# Patient Record
Sex: FEMALE | Race: AMERICAN INDIAN OR ALASKA NATIVE
[De-identification: names, ages, dates, MRNs, and addresses within clinical notes are randomized per-mention and may not be internally consistent; named-entity substitution may affect disease eponyms.]

---

## 2021-04-13 LAB
ALBUMIN SERPL-MCNC: 3.8 G/DL (ref 3.9–5)
ALT SERPL-CCNC: 20 UNITS/L (ref 7–56)
BASOPHILS # (AUTO): 0 K/MM3 (ref 0–0.1)
BASOPHILS NFR BLD AUTO: 0.3 % (ref 0–1.8)
BUN SERPL-MCNC: 9 MG/DL (ref 7–17)
BUN/CREAT SERPL: 15 %
CALCIUM SERPL-MCNC: 9 MG/DL (ref 8.4–10.2)
EOSINOPHIL # BLD AUTO: 0.1 K/MM3 (ref 0–0.4)
EOSINOPHIL NFR BLD AUTO: 1.6 % (ref 0–4.3)
HCT VFR BLD CALC: 34.3 % (ref 30.3–42.9)
HEMOLYSIS INDEX: 0
HGB BLD-MCNC: 12.1 GM/DL (ref 10.1–14.3)
LYMPHOCYTES # BLD AUTO: 1.5 K/MM3 (ref 1.2–5.4)
LYMPHOCYTES NFR BLD AUTO: 29.4 % (ref 13.4–35)
MCHC RBC AUTO-ENTMCNC: 35 % (ref 30–34)
MCV RBC AUTO: 83 FL (ref 79–97)
MONOCYTES # (AUTO): 0.3 K/MM3 (ref 0–0.8)
MONOCYTES % (AUTO): 6.1 % (ref 0–7.3)
PLATELET # BLD: 401 K/MM3 (ref 140–440)
RBC # BLD AUTO: 4.14 M/MM3 (ref 3.65–5.03)

## 2021-04-13 NOTE — HISTORY AND PHYSICAL REPORT
History of Present Illness


Date of examination: 21


Date of admission: 


2021


Chief complaint: 





I have fibroids


History of present illness: 





Pt is a 50 year old  who presents with complaint of uterine fibroids that 

are causing pain and bleeding. Pt has noted that symptoms have gotten worse with

time and she is looking for curative therapy.





Medications and Allergies


                                    Allergies











Allergy/AdvReac Type Severity Reaction Status Date / Time


 


No Known Allergies Allergy   Unverified 21 14:58











                                Home Medications











 Medication  Instructions  Recorded  Confirmed  Last Taken  Type


 


Hydroxychloroquine [Plaquenil] 200 mg PO QDAY 21 Unknown History


 


Indomethacin [Indocin] 15 mg PO Q8H 21 Unknown History











Active Meds: 


Active Medications





Acetaminophen (Acetaminophen 500 Mg Tab)  1,000 mg PO PREOP BRITTNEY


   Stop: 21 20:00


Celecoxib (Celecoxib 200 Mg Cap)  200 mg PO PREOP NR


   Stop: 21 20:00


Fentanyl (Fentanyl 100 Mcg/2 Ml Inj)  100 mcg IV ONCE PRN


   PRN Reason: sedation for nerve block


   Stop: 21 20:00


Gabapentin (Gabapentin 300 Mg Cap)  300 mg PO PREOP NR


   Stop: 21 20:00


Lactated Ringer's (Lactated Ringers)  1,000 mls @ 100 mls/hr IV AS DIRECT BRITTNEY


   Stop: 21 23:59


Cefazolin Sodium (Ancef/Sterile Water 2 Gm/20 Ml)  2 gm in 20 mls @ 80 mls/hr IV

PREOP NR; Protocol


Midazolam HCl (Midazolam 2 Mg/2 Ml Inj)  2 mg IV PREOP NR


   Stop: 21 20:00


Scopolamine (Scopolamine Transdermal Patch 72 Hr)  1 each TD PREOP NR


   Stop: 21 20:00











Exam


                                   Vital Signs











Temp Pulse Resp BP Pulse Ox


 


 97.8 F   86   18   126/90   99 


 


 21 10:30  21 10:30  21 10:30  21 10:30  21 10:30














- General physical appearance


Positive: well developed, well nourished, no distress





- Eyes


Positive: PERRL, normal occular movement





- Neck


Positive: no masses, no bruits, trachea midline, no venous distension





- Respiratory


Positive: normal expansion, normal respiratory effort, clear to auscultation





- Cardiovascular


Rhythm: regular


Heart Sounds: Present: S1 & S2.  Absent: rub, click





- Extremities


Extremities: no ischemia, pulses symmetrical, No edema





- Abdomen


Abdomen: Present: soft, bowel sounds normal, masses





- Genitourinary


Female Genitourinary: masses (enlarged irregular uterus consistent with uterine 

fibroids)





- Psychiatric


Psychiatric: appropriate mood/affect





Results





- Labs





                                 21 10:40





                                 21 10:40


                              Abnormal lab results











  21 Range/Units





  10:40 10:40 


 


MCHC  35 H   (30-34)  %


 


Sodium   136 L  (137-145)  mmol/L


 


Albumin   3.8 L  (3.9-5)  g/dL








                                 Diabetes panel











  21 Range/Units





  10:40 


 


Sodium  136 L  (137-145)  mmol/L


 


Potassium  4.0  (3.6-5.0)  mmol/L


 


Chloride  102.7  ()  mmol/L


 


Carbon Dioxide  24  (22-30)  mmol/L


 


BUN  9  (7-17)  mg/dL


 


Creatinine  0.6  (0.6-1.2)  mg/dL


 


Glucose  92  ()  mg/dL


 


Calcium  9.0  (8.4-10.2)  mg/dL


 


AST  13  (5-40)  units/L


 


ALT  20  (7-56)  units/L


 


Alkaline Phosphatase  104  ()  units/L


 


Total Protein  7.3  (6.3-8.2)  g/dL


 


Albumin  3.8 L  (3.9-5)  g/dL








                                  Calcium panel











  21 Range/Units





  10:40 


 


Calcium  9.0  (8.4-10.2)  mg/dL


 


Albumin  3.8 L  (3.9-5)  g/dL








                                 Pituitary panel











  21 Range/Units





  10:40 


 


Sodium  136 L  (137-145)  mmol/L


 


Potassium  4.0  (3.6-5.0)  mmol/L


 


Chloride  102.7  ()  mmol/L


 


Carbon Dioxide  24  (22-30)  mmol/L


 


BUN  9  (7-17)  mg/dL


 


Creatinine  0.6  (0.6-1.2)  mg/dL


 


Glucose  92  ()  mg/dL


 


Calcium  9.0  (8.4-10.2)  mg/dL








                                  Adrenal panel











  21 Range/Units





  10:40 


 


Sodium  136 L  (137-145)  mmol/L


 


Potassium  4.0  (3.6-5.0)  mmol/L


 


Chloride  102.7  ()  mmol/L


 


Carbon Dioxide  24  (22-30)  mmol/L


 


BUN  9  (7-17)  mg/dL


 


Creatinine  0.6  (0.6-1.2)  mg/dL


 


Glucose  92  ()  mg/dL


 


Calcium  9.0  (8.4-10.2)  mg/dL


 


Total Bilirubin  0.20  (0.1-1.2)  mg/dL


 


AST  13  (5-40)  units/L


 


ALT  20  (7-56)  units/L


 


Alkaline Phosphatase  104  ()  units/L


 


Total Protein  7.3  (6.3-8.2)  g/dL


 


Albumin  3.8 L  (3.9-5)  g/dL














Assessment and Plan





Pt here for abdominal myomectomy. consents signed and placed on chart. Pt 

understands risks and benefits. Will proceed with surgery

## 2021-04-13 NOTE — ANESTHESIA CONSULTATION
Anesthesia Consult and Med Hx


Date of service: 04/14/21





- Airway


Anesthetic Teeth Evaluation: Good


ROM Head & Neck: Adequate


Mental/Hyoid Distance: Adequate


Mallampati Class: Class II


Intubation Access Assessment: Probably Good





- Pulmonary Exam


CTA: Yes





- Cardiac Exam


Cardiac Exam: RRR





- Pre-Operative Health Status


ASA Pre-Surgery Classification: ASA2


Proposed Anesthetic Plan: General


Nerve Block: TAP





- Pulmonary


Hx Smoking: No


Hx Respiratory Symptoms: No





- Cardiovascular System


Hx Hypertension: No





- Central Nervous System


CVA: No





- Endocrine


Hx Renal Disease: No


Hx Liver Disease: No


Hx Insulin Dependent Diabetes: No


Hx Non-Insulin Dependent Diabetes: No


Hx Thyroid Disease: No





- Hematic


Hx Anemia: Yes





- Other Systems


Hx Obesity: Yes (BMI 33)





- Additional Comments


Anesthesia Medical History Comments: PMH RA on plaquenil. No hx steroid use. No 

hx anesthetic complications.

## 2021-04-14 ENCOUNTER — HOSPITAL ENCOUNTER (INPATIENT)
Dept: HOSPITAL 5 - OR | Age: 51
LOS: 3 days | Discharge: HOME | DRG: 743 | End: 2021-04-17
Attending: OBSTETRICS & GYNECOLOGY | Admitting: OBSTETRICS & GYNECOLOGY
Payer: MEDICARE

## 2021-04-14 DIAGNOSIS — D64.9: ICD-10-CM

## 2021-04-14 DIAGNOSIS — D25.1: Primary | ICD-10-CM

## 2021-04-14 DIAGNOSIS — N92.0: ICD-10-CM

## 2021-04-14 DIAGNOSIS — Z20.822: ICD-10-CM

## 2021-04-14 DIAGNOSIS — Z79.891: ICD-10-CM

## 2021-04-14 DIAGNOSIS — E66.9: ICD-10-CM

## 2021-04-14 DIAGNOSIS — D25.2: ICD-10-CM

## 2021-04-14 DIAGNOSIS — Z79.01: ICD-10-CM

## 2021-04-14 DIAGNOSIS — Z79.899: ICD-10-CM

## 2021-04-14 PROCEDURE — 86901 BLOOD TYPING SEROLOGIC RH(D): CPT

## 2021-04-14 PROCEDURE — 88304 TISSUE EXAM BY PATHOLOGIST: CPT

## 2021-04-14 PROCEDURE — 84703 CHORIONIC GONADOTROPIN ASSAY: CPT

## 2021-04-14 PROCEDURE — 36415 COLL VENOUS BLD VENIPUNCTURE: CPT

## 2021-04-14 PROCEDURE — 0UB90ZZ EXCISION OF UTERUS, OPEN APPROACH: ICD-10-PCS | Performed by: OBSTETRICS & GYNECOLOGY

## 2021-04-14 PROCEDURE — 85018 HEMOGLOBIN: CPT

## 2021-04-14 PROCEDURE — 88305 TISSUE EXAM BY PATHOLOGIST: CPT

## 2021-04-14 PROCEDURE — 80053 COMPREHEN METABOLIC PANEL: CPT

## 2021-04-14 PROCEDURE — 86850 RBC ANTIBODY SCREEN: CPT

## 2021-04-14 PROCEDURE — 64450 NJX AA&/STRD OTHER PN/BRANCH: CPT

## 2021-04-14 PROCEDURE — 85014 HEMATOCRIT: CPT

## 2021-04-14 PROCEDURE — U0003 INFECTIOUS AGENT DETECTION BY NUCLEIC ACID (DNA OR RNA); SEVERE ACUTE RESPIRATORY SYNDROME CORONAVIRUS 2 (SARS-COV-2) (CORONAVIRUS DISEASE [COVID-19]), AMPLIFIED PROBE TECHNIQUE, MAKING USE OF HIGH THROUGHPUT TECHNOLOGIES AS DESCRIBED BY CMS-2020-01-R: HCPCS

## 2021-04-14 PROCEDURE — 86900 BLOOD TYPING SEROLOGIC ABO: CPT

## 2021-04-14 PROCEDURE — 85025 COMPLETE CBC W/AUTO DIFF WBC: CPT

## 2021-04-14 PROCEDURE — C1765 ADHESION BARRIER: HCPCS

## 2021-04-14 RX ADMIN — KETOROLAC TROMETHAMINE SCH MG: 30 INJECTION, SOLUTION INTRAMUSCULAR at 23:49

## 2021-04-14 RX ADMIN — DEXTROSE, SODIUM CHLORIDE, SODIUM LACTATE, POTASSIUM CHLORIDE, AND CALCIUM CHLORIDE SCH MLS/HR: 5; .6; .31; .03; .02 INJECTION, SOLUTION INTRAVENOUS at 13:22

## 2021-04-14 RX ADMIN — OXYCODONE AND ACETAMINOPHEN PRN TAB: 5; 325 TABLET ORAL at 14:43

## 2021-04-14 RX ADMIN — KETOROLAC TROMETHAMINE SCH MG: 30 INJECTION, SOLUTION INTRAMUSCULAR at 18:05

## 2021-04-14 RX ADMIN — DOCUSATE SODIUM SCH MG: 100 CAPSULE, LIQUID FILLED ORAL at 22:30

## 2021-04-14 RX ADMIN — DEXTROSE, SODIUM CHLORIDE, SODIUM LACTATE, POTASSIUM CHLORIDE, AND CALCIUM CHLORIDE SCH MLS/HR: 5; .6; .31; .03; .02 INJECTION, SOLUTION INTRAVENOUS at 23:49

## 2021-04-14 RX ADMIN — OXYCODONE AND ACETAMINOPHEN PRN TAB: 5; 325 TABLET ORAL at 22:30

## 2021-04-14 RX ADMIN — KETOROLAC TROMETHAMINE SCH MG: 30 INJECTION, SOLUTION INTRAMUSCULAR at 12:10

## 2021-04-14 NOTE — OPERATIVE REPORT
Operative Report


Operative Report: 


Preoperative diagnosis: Uterine fibroids 





Postoperative diagnosis: Same





Procedure: Abdominal myomectomy





Surgeon: Dr. Abbie Mcqueen


Assistant: 


EBL: 200 cc


Urine output: 400 cc


IV fluids: 1800 cc LR


Cell saver return: 0 mL


Findings: Markedly enlarged 18 week uterus secondary to multiple uterine 

fibroids of various sizes


Specimens: Uterine fibroids


Complications: None





Procedure: The patient was admitted to the OR with IV running and in place.  She

was properly identified as herself.  She was given general anesthesia without 

difficulty.  She was then put a Mcgowan catheter was in place and she was prepped 

and draped in normal sterile fashion.  This incision was then made with the 

scalpel and carried to underlying fascia using the scalpel and the Bovie.  

Fascia was incised in midline and the incision was extended bilaterally using 

the curved Zeng scissors.  The fascia was then dissected from the underlying 

rectus muscles in a series of sharp and blunt dissection.  The peritoneum was 

entered sharply using the Metzenbaum scissors.  The peritoneal incision was then

extended superiorly and inferiorly.  The uterus was grasped and delivered 

through the incision anteriorly.  Multiple fibroids were noted some were 

pedunculated others were intramural.  The largest fibroid appeared to be in the 

fundus of the uterus.  At this point the uterus was injected with Pitressin 

solution in multiple areas totaling 25 cc.  Using the Bovie latitudinal incision

was made over the area of the largest fibroid.  It was found to have some 

calcifications in its body.  The fibroid was dissected from the underlying 

uterus using the Metzenbaum scissors and retraction.  The fibroid appeared to be

approximately 8 cm in diameter.  The base of the fibroid was then closed in a 

running fashion with 0 Vicryl and the serosa was closed with 2-0 Vicryl in a 

baseball stitch.  Once this area was close attention was turned to the area of 

the uterus.  Multiple fibroids were taken of both those that were pedunculated 

in those that were intramural until in total 12 fibroids were removed.  The 

areas underlying all fibroids were closed with 0 Vicryl in running fashion these

were closed in multiple layers until the serosal layer which was closed with a 

baseball stitch. Pieces of Interceed were then placed over the external portions

of the incisions.  The specimens was handed off.  The abdomen was then copiously

irrigated with warm normal saline.  Following this the uterus was replaced into 

the abdominal cavity.  At this point the muscles were reapproximated in the 

midline using individual sutures of 0 Vicryl.  Following this the fascia was 

closed in a running fashion using 0 Vicryl.  Tissue was then copiously irrig

ated.   Skin was closed in a running fashion using 3-0 Monocryl.  The sponge lap

needle and instrument counts were correct 2.  The patient tolerated the 

procedure well.  She was taken to recovery in stable condition.

## 2021-04-14 NOTE — POST OPERATIVE NOTE
Pre-op diagnosis: Uterine fibroids, menorrhagia, pelvic pain


Post-op diagnosis: same


Findings: 





Uterine fibroids of various shapes and sizes within the confines of an 

irregularly shaped enlarged uterus


Procedure: 





Abdominal myomectomy


Anesthesia: LYNDA


Surgeon: LEN COLEY


Estimated blood loss: other (200)


Pathology: list (Multiple fibroids of various sizes)


Specimen disposition: to lab


Condition: stable


Disposition: PACU

## 2021-04-15 LAB
HCT VFR BLD CALC: 27 % (ref 30.3–42.9)
HGB BLD-MCNC: 9 GM/DL (ref 10.1–14.3)

## 2021-04-15 RX ADMIN — SIMETHICONE PRN MG: 80 TABLET, CHEWABLE ORAL at 18:12

## 2021-04-15 RX ADMIN — OXYCODONE AND ACETAMINOPHEN PRN TAB: 5; 325 TABLET ORAL at 10:06

## 2021-04-15 RX ADMIN — OXYCODONE AND ACETAMINOPHEN PRN TAB: 5; 325 TABLET ORAL at 13:46

## 2021-04-15 RX ADMIN — DEXTROSE, SODIUM CHLORIDE, SODIUM LACTATE, POTASSIUM CHLORIDE, AND CALCIUM CHLORIDE SCH MLS/HR: 5; .6; .31; .03; .02 INJECTION, SOLUTION INTRAVENOUS at 08:15

## 2021-04-15 RX ADMIN — IBUPROFEN PRN MG: 800 TABLET, FILM COATED ORAL at 22:23

## 2021-04-15 RX ADMIN — OXYCODONE AND ACETAMINOPHEN PRN TAB: 5; 325 TABLET ORAL at 20:06

## 2021-04-15 RX ADMIN — DOCUSATE SODIUM SCH MG: 100 CAPSULE, LIQUID FILLED ORAL at 22:22

## 2021-04-15 RX ADMIN — KETOROLAC TROMETHAMINE SCH MG: 30 INJECTION, SOLUTION INTRAMUSCULAR at 05:20

## 2021-04-15 RX ADMIN — IBUPROFEN PRN MG: 800 TABLET, FILM COATED ORAL at 08:15

## 2021-04-15 RX ADMIN — DOCUSATE SODIUM SCH MG: 100 CAPSULE, LIQUID FILLED ORAL at 10:07

## 2021-04-15 NOTE — PROGRESS NOTE
Assessment and Plan





POD 1 s/p abdominal myomectomy. Doing well. Encourage ambulation . Will advance 

diet after flatus 





Subjective





- Subjective


Date of service: 04/15/21


Interval history: 





Pt is a 50 year old  who is POD 1 s/p Abdominal myomectomy. No flatus yet. 

Doing well. 


Patient reports: appetite normal, voiding normally, pain well controlled, 

ambulating normally





Objective





- Vital Signs


Latest vital signs: 


                                   Vital Signs











  Temp Pulse Pulse Resp BP BP Pulse Ox


 


 04/15/21 16:10  98 F  74   18   115/68  98


 


 04/15/21 14:46     16   


 


 04/15/21 13:46     16   


 


 04/15/21 12:20  97.9 F  73   18   100/59  100


 


 04/15/21 11:06     16   


 


 04/15/21 10:06     16   


 


 04/15/21 09:15     16   


 


 04/15/21 08:44    70  16   


 


 04/15/21 08:32  97.9 F  84   18   106/64  100


 


 04/15/21 08:15     16   


 


 04/15/21 05:50     18   


 


 04/15/21 05:20     18   


 


 04/15/21 05:14  97.7 F  68   18  95/60   99


 


 04/15/21 00:19  98.2 F  61   18  103/60   97


 


 21 23:49     18   


 


 21 23:30     18   


 


 21 22:30     18   


 


 21 21:01  97.3 F L  64   18  98/52   100








                                Intake and Output











 04/15/21 04/15/21 04/15/21





 06:59 14:59 22:59


 


Intake Total 120 1000 


 


Output Total 500 500 


 


Balance -380 500 


 


Intake:   


 


  IV  1000 


 


    D5lr 1,000 ml @ 125 mls/  1000 





    hr IV AS DIRECT [facility] Rx#:   





    [id number]   


 


  Intake, Free Water 120  


 


Output:   


 


  Urine 500 500 


 


    Indwelling Catheter 500  


 


    Void  500 


 


Other:   


 


  Total, Output Amount 500 500 


 


  Voiding Method  Toilet 


 


  # Voids   


 


    Void  1 














- Exam


Cardiovascular: Present: Regular rate, Normal S1, Normal S2


Lungs: Present: Clear to auscultation, Normal air movement


Abdomen: Present: normal appearance, soft, distention (slightly)


Uterus: Present: normal, firm


Extremities: Present: normal


Incision: Present: normal, dry, intact, dressed





- Labs


Labs: 


                              Abnormal lab results











  04/15/21 Range/Units





  07:01 


 


Hgb  9.0 L D  (10.1-14.3)  gm/dl


 


Hct  27.0 L D  (30.3-42.9)  %

## 2021-04-16 RX ADMIN — DOCUSATE SODIUM SCH MG: 100 CAPSULE, LIQUID FILLED ORAL at 23:13

## 2021-04-16 RX ADMIN — DOCUSATE SODIUM SCH MG: 100 CAPSULE, LIQUID FILLED ORAL at 10:34

## 2021-04-16 RX ADMIN — SIMETHICONE PRN MG: 80 TABLET, CHEWABLE ORAL at 11:54

## 2021-04-16 RX ADMIN — OXYCODONE AND ACETAMINOPHEN PRN TAB: 5; 325 TABLET ORAL at 05:15

## 2021-04-16 RX ADMIN — OXYCODONE AND ACETAMINOPHEN PRN TAB: 5; 325 TABLET ORAL at 15:38

## 2021-04-16 RX ADMIN — OXYCODONE AND ACETAMINOPHEN PRN TAB: 5; 325 TABLET ORAL at 23:13

## 2021-04-16 RX ADMIN — OXYCODONE AND ACETAMINOPHEN PRN TAB: 5; 325 TABLET ORAL at 10:34

## 2021-04-16 NOTE — DISCHARGE SUMMARY
Providers





- Providers


Date of Admission: 


04/14/21 11:53





Date of discharge: 04/17/21


Attending physician: 


LEN COLEY








Hospitalization


Reason for admission: other


Procedure: other (abdominal myomectomy)


Incision: normal, dry, intact


Discharge diagnosis: other (uterine fibroids)


Condition at discharge: Good


Disposition: DC-01 TO HOME OR SELFCARE





Plan





- Discharge Medications


Prescriptions: 


Docusate Sodium [Colace] 100 mg PO BID #60 capsule


Ibuprofen [Motrin] 800 mg PO Q8HR PRN #40 tablet


 PRN Reason: Pain, Moderate (4-6)


oxyCODONE /ACETAMINOPHEN [Percocet 5/325] 2 tab PO Q6HR PRN #40 tablet


 PRN Reason: Pain





- Provider Discharge Summary


Activity: routine, no sex for 6 weeks, no heavy lifting 4 weeks, no strenuous 

exercise


Diet: routine


Instructions: routine


Additional instructions: 


[]  Smoking cessation referral if applicable(refer to patient education folder 

for contact #)


[]  Refer to [facility] Booklet








Call your doctor immediately for:


* Fever > 100.5


* Heavy vaginal bleeding ( >1 pad per hour)


* Severe persistent headache


* Shortness of breath


* Reddened, hot, painful area to leg or breast


* Drainage or odor from incision.





* Keep incision clean and dry at all times and follow doctor's instructions 

regarding bathing/showering











- Follow up plan


Follow up: 


DR ALCIRA [Other] - 7 Days


LEN COLEY MD [Staff Physician] - 10 Days

## 2021-04-16 NOTE — PROGRESS NOTE
Assessment and Plan





POD 2 s/p abdominal myomectomy. Pt has yet to pass flatus, but feels rumbling. 

Plan for discharge in the am and after flatus. Will advance diet





Subjective





- Subjective


Date of service: 21


Interval history: 





Pt is a 50 year old  who is POD 1 s/p Abdominal myomectomy. No flatus yet. 

Doing well. 


Patient reports: appetite normal, voiding normally, pain well controlled, 

ambulating normally, other (no flatus yet)





Objective





- Vital Signs


Latest vital signs: 


                                   Vital Signs











  Temp Pulse Resp BP BP Pulse Ox


 


 21 16:31   77   123/78   100


 


 21 16:30  98.6 F   18   


 


 21 08:30  98.0 F  77  18  125/76   97


 


 21 06:04  97.8 F  83  18   109/62  95


 


 21 01:30  97.9 F  74  20   125/65 


 


 04/15/21 20:41  98.4 F  68  18  114/77   97








                                Intake and Output











 21





 06:59 14:59 22:59


 


Intake Total 120  


 


Balance 120  


 


Intake:   


 


  Intake, Free Water 120  


 


Other:   


 


  # Voids   


 


    Void 2  














- Exam


Breasts: Present: deferred


Cardiovascular: Present: Regular rate, Normal S1, Normal S2


Lungs: Present: Clear to auscultation, Normal air movement


Abdomen: Present: normal appearance, soft


Extremities: Present: normal


Incision: Present: normal, dry, intact

## 2021-04-17 VITALS — DIASTOLIC BLOOD PRESSURE: 75 MMHG | SYSTOLIC BLOOD PRESSURE: 114 MMHG

## 2021-04-17 RX ADMIN — KETOROLAC TROMETHAMINE SCH: 30 INJECTION, SOLUTION INTRAMUSCULAR at 00:27

## 2021-04-17 RX ADMIN — OXYCODONE AND ACETAMINOPHEN PRN TAB: 5; 325 TABLET ORAL at 06:31

## 2021-04-17 RX ADMIN — OXYCODONE AND ACETAMINOPHEN PRN TAB: 5; 325 TABLET ORAL at 10:19

## 2021-04-17 RX ADMIN — IBUPROFEN PRN MG: 800 TABLET, FILM COATED ORAL at 10:21

## 2021-04-17 RX ADMIN — DOCUSATE SODIUM SCH MG: 100 CAPSULE, LIQUID FILLED ORAL at 10:22

## 2024-09-15 PROBLEM — Z00.00 ENCOUNTER FOR PREVENTIVE HEALTH EXAMINATION: Status: ACTIVE | Noted: 2024-09-15

## 2024-09-16 PROBLEM — R35.0 FREQUENCY OF MICTURITION: Status: ACTIVE | Noted: 2024-09-16

## 2024-09-16 PROBLEM — Z80.0 FAMILY HISTORY OF PANCREATIC CANCER: Status: ACTIVE | Noted: 2024-09-16

## 2024-09-16 PROBLEM — Z80.1 FAMILY HISTORY OF LUNG CANCER: Status: ACTIVE | Noted: 2024-09-16

## 2024-09-17 PROBLEM — Z78.9 NON-SMOKER: Status: ACTIVE | Noted: 2024-09-16

## 2024-09-17 PROBLEM — N32.81 OVERACTIVE BLADDER: Status: ACTIVE | Noted: 2024-09-17

## 2024-09-17 PROBLEM — Z78.9 SOCIAL ALCOHOL USE: Status: ACTIVE | Noted: 2024-09-16

## 2024-12-09 ENCOUNTER — APPOINTMENT (OUTPATIENT)
Dept: UROLOGY | Facility: CLINIC | Age: 54
End: 2024-12-09
Payer: MEDICARE

## 2024-12-09 VITALS
TEMPERATURE: 97.6 F | BODY MASS INDEX: 32.76 KG/M2 | WEIGHT: 182 LBS | DIASTOLIC BLOOD PRESSURE: 98 MMHG | HEART RATE: 79 BPM | OXYGEN SATURATION: 99 % | SYSTOLIC BLOOD PRESSURE: 151 MMHG | RESPIRATION RATE: 16 BRPM

## 2024-12-09 DIAGNOSIS — N32.81 OVERACTIVE BLADDER: ICD-10-CM

## 2024-12-09 PROCEDURE — 99213 OFFICE O/P EST LOW 20 MIN: CPT

## 2024-12-09 PROCEDURE — 51798 US URINE CAPACITY MEASURE: CPT

## 2025-06-02 ENCOUNTER — APPOINTMENT (OUTPATIENT)
Dept: UROLOGY | Facility: CLINIC | Age: 55
End: 2025-06-02
Payer: MEDICARE

## 2025-06-02 DIAGNOSIS — N39.41 URGE INCONTINENCE: ICD-10-CM

## 2025-06-02 DIAGNOSIS — R35.0 FREQUENCY OF MICTURITION: ICD-10-CM

## 2025-06-02 DIAGNOSIS — N32.81 OVERACTIVE BLADDER: ICD-10-CM

## 2025-06-02 PROCEDURE — 99214 OFFICE O/P EST MOD 30 MIN: CPT

## 2025-06-02 PROCEDURE — 51798 US URINE CAPACITY MEASURE: CPT

## 2025-09-03 ENCOUNTER — NON-APPOINTMENT (OUTPATIENT)
Age: 55
End: 2025-09-03

## 2025-09-03 ENCOUNTER — APPOINTMENT (OUTPATIENT)
Dept: UROLOGY | Facility: CLINIC | Age: 55
End: 2025-09-03
Payer: MEDICARE

## 2025-09-03 VITALS
DIASTOLIC BLOOD PRESSURE: 76 MMHG | HEIGHT: 62.5 IN | HEART RATE: 76 BPM | SYSTOLIC BLOOD PRESSURE: 124 MMHG | TEMPERATURE: 100 F | BODY MASS INDEX: 32.65 KG/M2 | WEIGHT: 182 LBS

## 2025-09-03 DIAGNOSIS — R35.0 FREQUENCY OF MICTURITION: ICD-10-CM

## 2025-09-03 DIAGNOSIS — N32.81 OVERACTIVE BLADDER: ICD-10-CM

## 2025-09-03 PROCEDURE — 51798 US URINE CAPACITY MEASURE: CPT

## 2025-09-03 PROCEDURE — 99213 OFFICE O/P EST LOW 20 MIN: CPT
